# Patient Record
(demographics unavailable — no encounter records)

---

## 2025-07-22 NOTE — PLAN
[FreeTextEntry1] : Total time spent caring for the patient today was 65 minutes. This includes time spent before the visit reviewing the chart, time spent during the visit, and time spent after the visit on documentation, etc.

## 2025-07-22 NOTE — HEALTH RISK ASSESSMENT
[No] : In the past 12 months have you used drugs other than those required for medical reasons? No [No falls in past year] : Patient reported no falls in the past year [Little interest or pleasure doing things] : 1) Little interest or pleasure doing things [Feeling down, depressed, or hopeless] : 2) Feeling down, depressed, or hopeless [0] : 2) Feeling down, depressed, or hopeless: Not at all (0) [PHQ-2 Negative - No further assessment needed] : PHQ-2 Negative - No further assessment needed [Never] : Never [de-identified] : none [de-identified] : ENT [de-identified] : pt is active, a lot walking [de-identified] : fair [[id number]] : 0

## 2025-07-22 NOTE — HISTORY OF PRESENT ILLNESS
[FreeTextEntry8] : 65-year-old female with osteoporosis, hyperlipidemia, prediabetes, chronic hearing loss complicated by tinnitus is presenting today for an acute visit.  Her last physical was in March of this year.  Blood test results from March visit outside provider was reviewed with the patient during the visit today.  Notable for an A1c of 6.3% and hyperlipidemia despite taking atorvastatin 20 mg daily.  She has been taking alendronate 70 mg for several years and DEXA scan in September 2024 was consistent with osteoporosis.  She has also been taking atorvastatin 20 mg for 2 years and is compliant with the medication.  She was recently prescribed prednisone 10 mg to take daily for 15 days by her ENT to treat tinnitus.  It is unclear why she is taking triamterene-hydrochlorothiazide (she states her ENT sent it for her ears).  She is also up-to-date with colon cancer and breast cancer screening, results reviewed today).  Lastly, she is reporting neck pain without any associated arm pain.  She denies any weakness or numbness of the arms.